# Patient Record
Sex: FEMALE | Race: BLACK OR AFRICAN AMERICAN | NOT HISPANIC OR LATINO | URBAN - METROPOLITAN AREA
[De-identification: names, ages, dates, MRNs, and addresses within clinical notes are randomized per-mention and may not be internally consistent; named-entity substitution may affect disease eponyms.]

---

## 2020-07-25 ENCOUNTER — TELEPHONE ENCOUNTER (OUTPATIENT)
Dept: URBAN - METROPOLITAN AREA CLINIC 13 | Facility: CLINIC | Age: 75
End: 2020-07-25

## 2020-07-25 RX ORDER — POLYETHYLENE GLYCOL 3350, SODIUM CHLORIDE, SODIUM BICARBONATE AND POTASSIUM CHLORIDE WITH LEMON FLAVOR 420; 11.2; 5.72; 1.48 G/4L; G/4L; G/4L; G/4L
TAKE 1/2 GALLON AT 5:00 PM DAY BEFORE PROCEDURE, TAKE SECOND 1/2 OF GALLON 6 HRS PRIOR TO PROCEDURE POWDER, FOR SOLUTION ORAL
Qty: 1 | Refills: 0 | OUTPATIENT
Start: 2018-11-07 | End: 2018-12-13

## 2020-07-26 ENCOUNTER — TELEPHONE ENCOUNTER (OUTPATIENT)
Dept: URBAN - METROPOLITAN AREA CLINIC 13 | Facility: CLINIC | Age: 75
End: 2020-07-26

## 2020-07-26 RX ORDER — METHYLPREDNISOLONE 4 MG/1
TABLET ORAL
Qty: 21 | Refills: 0 | Status: ACTIVE | COMMUNITY
Start: 2018-01-18

## 2020-07-26 RX ORDER — PRAVASTATIN SODIUM 40 MG/1
TAKE 1 TABLET DAILY TABLET ORAL
Refills: 0 | Status: ACTIVE | COMMUNITY
Start: 2018-11-07

## 2020-07-26 RX ORDER — HYDROCHLOROTHIAZIDE 12.5 MG/1
CAPSULE ORAL
Qty: 30 | Refills: 0 | Status: ACTIVE | COMMUNITY
Start: 2017-12-05

## 2020-07-26 RX ORDER — AZITHROMYCIN DIHYDRATE 250 MG/1
TABLET, FILM COATED ORAL
Qty: 6 | Refills: 0 | Status: ACTIVE | COMMUNITY
Start: 2018-01-09

## 2020-07-26 RX ORDER — CODEINE PHOSPHATE AND GUAIFENESIN 10; 100 MG/5ML; MG/5ML
TAKE 10MLS BY MOUTH EVERY 4 HOURS AS NEEDED FOR COUGH SOLUTION ORAL
Qty: 120 | Refills: 0 | Status: ACTIVE | COMMUNITY
Start: 2017-12-29

## 2020-07-26 RX ORDER — ALBUTEROL SULFATE 90 UG/1
TAKE 2 PUFFS BY MOUTH 4 TIMES A DAY AS NEEDED FOR WHEEZING AEROSOL, METERED RESPIRATORY (INHALATION)
Qty: 9 | Refills: 0 | Status: ACTIVE | COMMUNITY
Start: 2017-12-29

## 2020-07-26 RX ORDER — NYSTATIN 100000 [USP'U]/G
CREAM TOPICAL
Qty: 30 | Refills: 0 | Status: ACTIVE | COMMUNITY
Start: 2017-12-06

## 2020-07-26 RX ORDER — AZITHROMYCIN DIHYDRATE 250 MG/1
TABLET, FILM COATED ORAL
Qty: 6 | Refills: 0 | Status: ACTIVE | COMMUNITY
Start: 2018-10-11

## 2020-07-26 RX ORDER — HYDROCHLOROTHIAZIDE 25 MG/1
TAKE 1 TABLET DAILY TABLET ORAL
Refills: 0 | Status: ACTIVE | COMMUNITY
Start: 2018-11-07

## 2020-07-26 RX ORDER — OMEPRAZOLE 40 MG/1
CAPSULE, DELAYED RELEASE ORAL
Qty: 30 | Refills: 0 | Status: ACTIVE | COMMUNITY
Start: 2018-09-04

## 2020-07-26 RX ORDER — AMOXICILLIN AND CLAVULANATE POTASSIUM 875; 125 MG/1; MG/1
TABLET, FILM COATED ORAL
Qty: 20 | Refills: 0 | Status: ACTIVE | COMMUNITY
Start: 2018-01-18

## 2020-07-26 RX ORDER — OSELTAMIVIR PHOSPHATE 75 MG/1
TAKE ONE CAPSULE BY MOUTH TWICE A DAY FOR 5 DAYS CAPSULE ORAL
Qty: 10 | Refills: 0 | Status: ACTIVE | COMMUNITY
Start: 2017-12-29

## 2020-07-26 RX ORDER — TRIAMCINOLONE ACETONIDE 1 MG/G
CREAM TOPICAL
Qty: 30 | Refills: 0 | Status: ACTIVE | COMMUNITY
Start: 2017-12-06

## 2020-07-26 RX ORDER — AMLODIPINE BESYLATE 10 MG
TAKE 1 TABLET DAILY FOR BLOOD PRESSURE TABLET ORAL
Refills: 0 | Status: ACTIVE | COMMUNITY
Start: 2018-11-07

## 2020-07-26 RX ORDER — OMEPRAZOLE 40 MG/1
TAKE 1 CAPSULE TWICE DAILY CAPSULE, DELAYED RELEASE ORAL
Refills: 0 | Status: ACTIVE | COMMUNITY
Start: 2018-01-23

## 2020-07-26 RX ORDER — SPIRONOLACTONE 25 MG
TAKE 1 TABLET DAILY TABLET ORAL
Refills: 0 | Status: ACTIVE | COMMUNITY
Start: 2018-11-07

## 2024-02-29 ENCOUNTER — LAB (OUTPATIENT)
Dept: URBAN - METROPOLITAN AREA CLINIC 72 | Facility: CLINIC | Age: 79
End: 2024-02-29

## 2024-02-29 ENCOUNTER — OV NP (OUTPATIENT)
Dept: URBAN - METROPOLITAN AREA CLINIC 72 | Facility: CLINIC | Age: 79
End: 2024-02-29

## 2024-02-29 VITALS
HEART RATE: 65 BPM | WEIGHT: 156.8 LBS | HEIGHT: 61 IN | BODY MASS INDEX: 29.6 KG/M2 | SYSTOLIC BLOOD PRESSURE: 119 MMHG | TEMPERATURE: 97.3 F | DIASTOLIC BLOOD PRESSURE: 57 MMHG

## 2024-02-29 PROBLEM — 397881000: Status: ACTIVE | Noted: 2024-02-29

## 2024-02-29 PROBLEM — 428283002: Status: ACTIVE | Noted: 2024-02-29

## 2024-02-29 RX ORDER — METHYLPREDNISOLONE 4 MG/1
TABLET ORAL
Qty: 21 | Refills: 0 | Status: ON HOLD | COMMUNITY
Start: 2018-01-18

## 2024-02-29 RX ORDER — ALBUTEROL SULFATE 90 UG/1
TAKE 2 PUFFS BY MOUTH 4 TIMES A DAY AS NEEDED FOR WHEEZING AEROSOL, METERED RESPIRATORY (INHALATION)
Qty: 9 | Refills: 0 | Status: ON HOLD | COMMUNITY
Start: 2017-12-29

## 2024-02-29 RX ORDER — AZITHROMYCIN DIHYDRATE 250 MG/1
TABLET, FILM COATED ORAL
Qty: 6 | Refills: 0 | Status: ON HOLD | COMMUNITY
Start: 2018-01-09

## 2024-02-29 RX ORDER — NYSTATIN 100000 [USP'U]/G
CREAM TOPICAL
Qty: 30 | Refills: 0 | Status: ACTIVE | COMMUNITY
Start: 2017-12-06

## 2024-02-29 RX ORDER — AMLODIPINE BESYLATE 10 MG
TAKE 1 TABLET DAILY FOR BLOOD PRESSURE TABLET ORAL
Refills: 0 | Status: ACTIVE | COMMUNITY
Start: 2018-11-07

## 2024-02-29 RX ORDER — SPIRONOLACTONE 25 MG
TAKE 1 TABLET DAILY TABLET ORAL
Refills: 0 | Status: ACTIVE | COMMUNITY
Start: 2018-11-07

## 2024-02-29 RX ORDER — TRIAMCINOLONE ACETONIDE 1 MG/G
CREAM TOPICAL
Qty: 30 | Refills: 0 | Status: ACTIVE | COMMUNITY
Start: 2017-12-06

## 2024-02-29 RX ORDER — AMOXICILLIN AND CLAVULANATE POTASSIUM 875; 125 MG/1; MG/1
TABLET, FILM COATED ORAL
Qty: 20 | Refills: 0 | Status: ON HOLD | COMMUNITY
Start: 2018-01-18

## 2024-02-29 RX ORDER — HYDROCHLOROTHIAZIDE 25 MG/1
TAKE 1 TABLET DAILY TABLET ORAL
Refills: 0 | Status: ACTIVE | COMMUNITY
Start: 2018-11-07

## 2024-02-29 RX ORDER — OMEPRAZOLE 40 MG/1
TAKE 1 CAPSULE TWICE DAILY CAPSULE, DELAYED RELEASE ORAL
Refills: 0 | Status: ON HOLD | COMMUNITY
Start: 2018-01-23

## 2024-02-29 RX ORDER — PRAVASTATIN SODIUM 40 MG/1
TAKE 1 TABLET DAILY TABLET ORAL
Refills: 0 | Status: ACTIVE | COMMUNITY
Start: 2018-11-07

## 2024-02-29 RX ORDER — OSELTAMIVIR PHOSPHATE 75 MG/1
TAKE ONE CAPSULE BY MOUTH TWICE A DAY FOR 5 DAYS CAPSULE ORAL
Qty: 10 | Refills: 0 | Status: ON HOLD | COMMUNITY
Start: 2017-12-29

## 2024-02-29 RX ORDER — HYDROCHLOROTHIAZIDE 12.5 MG/1
CAPSULE ORAL
Qty: 30 | Refills: 0 | Status: ON HOLD | COMMUNITY
Start: 2017-12-05

## 2024-02-29 RX ORDER — CODEINE PHOSPHATE AND GUAIFENESIN 10; 100 MG/5ML; MG/5ML
TAKE 10MLS BY MOUTH EVERY 4 HOURS AS NEEDED FOR COUGH SOLUTION ORAL
Qty: 120 | Refills: 0 | Status: ON HOLD | COMMUNITY
Start: 2017-12-29

## 2024-02-29 NOTE — HPI-OTHER HISTORIES
Labs: -1/2/24.  CMP: Glucose 180.  Hemoglobin A1c 7.5. . Procedures: -Colonoscopy performed by Dr. Corcoran in 2018.  9 mm pedunculated descending colon polyp was removed.  Pancolonic large and small mouth diverticular disease.  Recommend repeat colonoscopy in 5 to 10 years based on path results.  Polyp was adenomatous.

## 2024-02-29 NOTE — HPI-TODAY'S VISIT:
78-year-old female new to the clinic here for history of colon polyps.  Past medical history of colon polyps, hypertension, GERD, osteopenia, hyperlipidemia, type 2 diabetes mellitus.   On interview today no dysphagia, heartburn, regurgitation, unintentional weight loss, nausea, vomiting, hematemesis or melena. Bowels are moving regularly without blood per rectum. No abdominal pain.  No complaints of bloating. No jaundice.  She was not aware she had diverticulosis. She denies diverticulitis.  No CP, SOB, palpitations, syncope, near-syncope or problems with anesthesia previously

## 2024-06-06 ENCOUNTER — OFFICE VISIT (OUTPATIENT)
Dept: URBAN - METROPOLITAN AREA MEDICAL CENTER 40 | Facility: MEDICAL CENTER | Age: 79
End: 2024-06-06

## 2024-06-12 ENCOUNTER — TELEPHONE ENCOUNTER (OUTPATIENT)
Dept: URBAN - METROPOLITAN AREA CLINIC 113 | Facility: CLINIC | Age: 79
End: 2024-06-12

## 2024-09-05 ENCOUNTER — OFFICE VISIT (OUTPATIENT)
Dept: URBAN - METROPOLITAN AREA MEDICAL CENTER 40 | Facility: MEDICAL CENTER | Age: 79
End: 2024-09-05
Payer: MEDICARE

## 2024-09-05 DIAGNOSIS — Z09 ENCOUNTER FOR FOLLOW-UP EXAMINATION AFTER COMPLETED TREATMENT FOR CONDITIONS OTHER THAN MALIGNANT NEOPLASM: ICD-10-CM

## 2024-09-05 DIAGNOSIS — Z86.010 ADENOMAS PERSONAL HISTORY OF COLONIC POLYPS: ICD-10-CM

## 2024-09-05 PROCEDURE — G0105 COLORECTAL SCRN; HI RISK IND: HCPCS | Performed by: INTERNAL MEDICINE

## 2024-09-05 PROCEDURE — 0529F INTRVL 3/>YR PTS CLNSCP DOCD: CPT | Performed by: INTERNAL MEDICINE

## 2024-09-05 RX ORDER — TRIAMCINOLONE ACETONIDE 1 MG/G
CREAM TOPICAL
Qty: 30 | Refills: 0 | Status: ACTIVE | COMMUNITY
Start: 2017-12-06

## 2024-09-05 RX ORDER — OMEPRAZOLE 40 MG/1
TAKE 1 CAPSULE TWICE DAILY CAPSULE, DELAYED RELEASE ORAL
Refills: 0 | Status: ON HOLD | COMMUNITY
Start: 2018-01-23

## 2024-09-05 RX ORDER — ALBUTEROL SULFATE 90 UG/1
TAKE 2 PUFFS BY MOUTH 4 TIMES A DAY AS NEEDED FOR WHEEZING AEROSOL, METERED RESPIRATORY (INHALATION)
Qty: 9 | Refills: 0 | Status: ON HOLD | COMMUNITY
Start: 2017-12-29

## 2024-09-05 RX ORDER — OSELTAMIVIR PHOSPHATE 75 MG/1
TAKE ONE CAPSULE BY MOUTH TWICE A DAY FOR 5 DAYS CAPSULE ORAL
Qty: 10 | Refills: 0 | Status: ON HOLD | COMMUNITY
Start: 2017-12-29

## 2024-09-05 RX ORDER — NYSTATIN 100000 [USP'U]/G
CREAM TOPICAL
Qty: 30 | Refills: 0 | Status: ACTIVE | COMMUNITY
Start: 2017-12-06

## 2024-09-05 RX ORDER — SPIRONOLACTONE 25 MG
TAKE 1 TABLET DAILY TABLET ORAL
Refills: 0 | Status: ACTIVE | COMMUNITY
Start: 2018-11-07

## 2024-09-05 RX ORDER — HYDROCHLOROTHIAZIDE 25 MG/1
TAKE 1 TABLET DAILY TABLET ORAL
Refills: 0 | Status: ACTIVE | COMMUNITY
Start: 2018-11-07

## 2024-09-05 RX ORDER — AMOXICILLIN AND CLAVULANATE POTASSIUM 875; 125 MG/1; MG/1
TABLET, FILM COATED ORAL
Qty: 20 | Refills: 0 | Status: ON HOLD | COMMUNITY
Start: 2018-01-18

## 2024-09-05 RX ORDER — HYDROCHLOROTHIAZIDE 12.5 MG/1
CAPSULE ORAL
Qty: 30 | Refills: 0 | Status: ON HOLD | COMMUNITY
Start: 2017-12-05

## 2024-09-05 RX ORDER — PRAVASTATIN SODIUM 40 MG/1
TAKE 1 TABLET DAILY TABLET ORAL
Refills: 0 | Status: ACTIVE | COMMUNITY
Start: 2018-11-07

## 2024-09-05 RX ORDER — AZITHROMYCIN DIHYDRATE 250 MG/1
TABLET, FILM COATED ORAL
Qty: 6 | Refills: 0 | Status: ON HOLD | COMMUNITY
Start: 2018-01-09

## 2024-09-05 RX ORDER — CODEINE PHOSPHATE AND GUAIFENESIN 10; 100 MG/5ML; MG/5ML
TAKE 10MLS BY MOUTH EVERY 4 HOURS AS NEEDED FOR COUGH SOLUTION ORAL
Qty: 120 | Refills: 0 | Status: ON HOLD | COMMUNITY
Start: 2017-12-29

## 2024-09-05 RX ORDER — METHYLPREDNISOLONE 4 MG/1
TABLET ORAL
Qty: 21 | Refills: 0 | Status: ON HOLD | COMMUNITY
Start: 2018-01-18

## 2024-09-05 RX ORDER — AMLODIPINE BESYLATE 10 MG
TAKE 1 TABLET DAILY FOR BLOOD PRESSURE TABLET ORAL
Refills: 0 | Status: ACTIVE | COMMUNITY
Start: 2018-11-07